# Patient Record
Sex: FEMALE | Race: WHITE
[De-identification: names, ages, dates, MRNs, and addresses within clinical notes are randomized per-mention and may not be internally consistent; named-entity substitution may affect disease eponyms.]

---

## 2019-06-19 ENCOUNTER — HOSPITAL ENCOUNTER (EMERGENCY)
Dept: HOSPITAL 10 - FTE | Age: 24
Discharge: HOME | End: 2019-06-19
Payer: COMMERCIAL

## 2019-06-19 ENCOUNTER — HOSPITAL ENCOUNTER (EMERGENCY)
Dept: HOSPITAL 91 - FTE | Age: 24
Discharge: HOME | End: 2019-06-19
Payer: COMMERCIAL

## 2019-06-19 VITALS
WEIGHT: 152.12 LBS | HEIGHT: 64 IN | HEIGHT: 64 IN | WEIGHT: 152.12 LBS | BODY MASS INDEX: 25.97 KG/M2 | BODY MASS INDEX: 25.97 KG/M2

## 2019-06-19 VITALS — HEART RATE: 74 BPM | SYSTOLIC BLOOD PRESSURE: 123 MMHG | RESPIRATION RATE: 24 BRPM | DIASTOLIC BLOOD PRESSURE: 64 MMHG

## 2019-06-19 DIAGNOSIS — R10.2: ICD-10-CM

## 2019-06-19 DIAGNOSIS — R35.0: Primary | ICD-10-CM

## 2019-06-19 LAB
ADD UMIC: YES
UR ASCORBIC ACID: NEGATIVE MG/DL
UR BACTERIA: (no result) /HPF
UR BILIRUBIN (DIP): NEGATIVE MG/DL
UR BLOOD (DIP): (no result) MG/DL
UR CLARITY: (no result)
UR COLOR: (no result)
UR GLUCOSE (DIP): NEGATIVE MG/DL
UR KETONES (DIP): NEGATIVE MG/DL
UR LEUKOCYTE ESTERASE (DIP): (no result) LEU/UL
UR NITRITE (DIP): NEGATIVE MG/DL
UR PH (DIP): 7 (ref 5–9)
UR RBC: 3 /HPF (ref 0–5)
UR SPECIFIC GRAVITY (DIP): 1 (ref 1–1.03)
UR SQUAMOUS EPITHELIAL CELL: (no result) /HPF
UR TOTAL PROTEIN (DIP): NEGATIVE MG/DL
UR UROBILINOGEN (DIP): NEGATIVE MG/DL
UR WBC: 13 /HPF (ref 0–5)
URINE BLOOD (DIP) POC: (no result)
URINE LEUKOCYTE EST (DIP) POC: (no result)
URINE PH (DIP) POC: 6.5 (ref 5–8.5)

## 2019-06-19 PROCEDURE — 81003 URINALYSIS AUTO W/O SCOPE: CPT

## 2019-06-19 PROCEDURE — 81001 URINALYSIS AUTO W/SCOPE: CPT

## 2019-06-19 PROCEDURE — 81025 URINE PREGNANCY TEST: CPT

## 2019-06-19 PROCEDURE — 99283 EMERGENCY DEPT VISIT LOW MDM: CPT

## 2019-06-19 NOTE — ERD
ER Documentation


Chief Complaint


Chief Complaint





LOWER ABDOMINAL PAIN RADIATING TO THE BACK X 2 WEEKS





HPI


23-year-old female with no reported past medical surgical history, history of 


UTIs, presents with complaint of dull lower pelvic discomfort, flank pain, and 


urinary frequency.  She otherwise denies fevers, chills, nausea, vomiting, 


diarrhea, abdominal pain, vaginal bleeding or discharge.  Last menstrual period 


was about 2 weeks ago reported as normal.  Has noticed change in smell of her 


urine.  She believes she has a UTI.  She otherwise is without complaint at time 


of evaluation.





ROS


All systems reviewed and are negative except as per history of present illness.





Medications


Home Meds


Active Scripts


Cephalexin* (Keflex*) 500 Mg Capsule, 500 MG PO BID for 7 Days, CAP


   Prov:AVINASH ESCOBEDO PA-C         6/19/19


Reported Medications


[none]   No Conflict Check


   10/16/13





Allergies


Allergies:  


Coded Allergies:  


     No Known Allergy (Unverified , 6/19/19)





PMhx/Soc


Medical and Surgical Hx:  pt denies Medical Hx, pt denies Surgical Hx


Hx Alcohol Use:  Yes (SOCIALLY)


Hx Substance Use:  No


Hx Tobacco Use:  No


Smoking Status:  Never smoker





FmHx


Family History:  No diabetes, No coronary disease, No other





Physical Exam


Vitals





Vital Signs


  Date      Temp  Pulse  Resp  B/P (MAP)   Pulse Ox  O2          O2 Flow    FiO2


Time                                                 Delivery    Rate


   6/19/19  97.7     74    24      123/64        99


     18:05                           (83)





Physical Exam


I have reviewed the triage vital signs.


Const: Well nourished, well developed, appears stated age


Eyes: PERRL, no conjunctival injection


HENT: NCAT, Neck supple without meningismus 


CV: RRR, Warm, well-perfused extremities


RESP: CTAB, Unlabored respiratory effort


GI: soft, non-tender, non-distended, no masses, mild left flank tenderness


MSK: No gross deformities appreciated


Skin: Warm, dry. No rashes


Neuro: grossly non focal


Psych: Appropriate mood and affect.


Results 24 hrs





Laboratory Tests


Test
                                6/19/19
19:50  6/19/19
19:53  6/19/19
19:54


Urine Color                        STRAW


Urine Clarity                      CLOUDY


Urine pH                                      7.0


Urine Specific Gravity                      1.005


Urine Ketones                      NEGATIVE mg/dL


Urine Nitrite                      NEGATIVE mg/dL


Urine Bilirubin                    NEGATIVE mg/dL


Urine Urobilinogen                 NEGATIVE mg/dL


Urine Leukocyte Esterase                2+ Tayla/ul


Urine Microscopic RBC                      3 /HPF


Urine Microscopic WBC                     13 /HPF


Urine Squamous Epithelial
Cells    MODERATE /HPF 
  
              



Urine Bacteria                     FEW /HPF


Urine Hemoglobin                         2+ mg/dL


Urine Glucose                      NEGATIVE mg/dL


Urine Total Protein                NEGATIVE mg/dl


Bedside Urine pH (LAB)                                       6.5


Bedside Urine Protein (LAB)                         Negative


Bedside Urine Glucose (UA)                          Negative


Bedside Urine Ketones (LAB)                         Negative


Bedside Urine Blood                                           2+


Bedside Urine Nitrite (LAB)                         Negative


Bedside Urine Leukocyte
Esterase   
                         1+ 
  



(L


POC Beta HCG, Qualitative                                          NEGATIVE








Procedures/MDM


Not Pregnant. Unlikely TOA, Ovarian Torsion, PID, gonorrhea/chlamydia. 


Low suspicion for Infected Urolithiasis, AAA, Cholecystitis, Pancreatitis, SBO, 


Appendicitis, or other acute abdomen.





ED course: UA with bacteria, 2+ leukocyte esterase, will treat with 7-day course


 of cephalexin





Rx: Keflex 500mg BID for 5 days


Disposition: Discharge home. SRP discussed. Advise follow up with primary care 


provider within 24-72 hours.





Departure


Diagnosis:  


   Primary Impression:  


   Urinary frequency


Condition:  Stable


Patient Instructions:  Urinary Tract Infections in Women


Referrals:  


COMMUNITY CLINICS


YOU HAVE RECEIVED A MEDICAL SCREENING EXAM AND THE RESULTS INDICATE THAT YOU DO 


NOT HAVE A CONDITION THAT REQUIRES URGENT TREATMENT IN THE EMERGENCY DEPARTMENT.





FURTHER EVALUATION AND TREATMENT OF YOUR CONDITION CAN WAIT UNTIL YOU ARE SEEN 


IN YOUR DOCTORS OFFICE WITHIN THE NEXT 1-2 DAYS. IT IS YOUR RESPONSIBILITY TO 


MAKE AN APPOINTMENT FOR FOLOW-UP CARE.





IF YOU HAVE A PRIMARY DOCTOR


--you should call your primary doctor and schedule an appointment





IF YOU DO NOT HAVE A PRIMARY DOCTOR YOU CAN CALL OUR PHYSICIAN REFERRAL HOTLINE 


AT


 [phone] 





IF YOU CAN NOT AFFORD TO SEE A PHYSICIAN YOU CAN CHOSE FROM THE FOLLOWING 


[facility]





[facility] [phone] [address] [facility] [address] [facility]. [facility] [phone] [address] [facility]. [facility] [[email or web address]] [phone] [address] [facility]. [facility] [phone] [address]. [facility] [phone] [address] [facility]. [facility]. [phone] [address]





Additional Instructions:  


Call your primary care doctor TOMORROW for an appointment during the next 2-3 


days.See the doctor sooner or return here if your condition worsens before your 


appointment time.











AVINASH ESCOBEDO PA-C             Jun 19, 2019 20:21

## 2019-06-26 ENCOUNTER — HOSPITAL ENCOUNTER (EMERGENCY)
Dept: HOSPITAL 91 - FTE | Age: 24
Discharge: HOME | End: 2019-06-26
Payer: COMMERCIAL

## 2019-06-26 ENCOUNTER — HOSPITAL ENCOUNTER (EMERGENCY)
Dept: HOSPITAL 10 - FTE | Age: 24
Discharge: HOME | End: 2019-06-26
Payer: COMMERCIAL

## 2019-06-26 VITALS
WEIGHT: 173.94 LBS | WEIGHT: 173.94 LBS | BODY MASS INDEX: 30.82 KG/M2 | BODY MASS INDEX: 30.82 KG/M2 | HEIGHT: 63 IN | HEIGHT: 63 IN

## 2019-06-26 VITALS — RESPIRATION RATE: 16 BRPM | SYSTOLIC BLOOD PRESSURE: 122 MMHG | DIASTOLIC BLOOD PRESSURE: 78 MMHG | HEART RATE: 63 BPM

## 2019-06-26 DIAGNOSIS — R07.89: Primary | ICD-10-CM

## 2019-06-26 LAB
ADD MAN DIFF?: NO
ANION GAP: 13 (ref 5–13)
BASOPHIL #: 0.1 10^3/UL (ref 0–0.1)
BASOPHILS %: 0.6 % (ref 0–2)
BLOOD UREA NITROGEN: 7 MG/DL (ref 7–20)
CALCIUM: 10.2 MG/DL (ref 8.4–10.2)
CARBON DIOXIDE: 27 MMOL/L (ref 21–31)
CHLORIDE: 105 MMOL/L (ref 97–110)
CREATININE: 0.57 MG/DL (ref 0.44–1)
EOSINOPHILS #: 0.6 10^3/UL (ref 0–0.5)
EOSINOPHILS %: 7.1 % (ref 0–7)
GLUCOSE: 88 MG/DL (ref 70–220)
HEMATOCRIT: 41.7 % (ref 37–47)
HEMOGLOBIN: 14.2 G/DL (ref 12–16)
IMMATURE GRANS #M: 0.03 10^3/UL (ref 0–0.03)
IMMATURE GRANS % (M): 0.4 % (ref 0–0.43)
LYMPHOCYTES #: 2.8 10^3/UL (ref 0.8–2.9)
LYMPHOCYTES %: 32.8 % (ref 15–51)
MEAN CORPUSCULAR HEMOGLOBIN: 29.5 PG (ref 29–33)
MEAN CORPUSCULAR HGB CONC: 34.1 G/DL (ref 32–37)
MEAN CORPUSCULAR VOLUME: 86.7 FL (ref 82–101)
MEAN PLATELET VOLUME: 10.3 FL (ref 7.4–10.4)
MONOCYTE #: 0.5 10^3/UL (ref 0.3–0.9)
MONOCYTES %: 5.9 % (ref 0–11)
NEUTROPHIL #: 4.5 10^3/UL (ref 1.6–7.5)
NEUTROPHILS %: 53.2 % (ref 39–77)
NUCLEATED RED BLOOD CELLS #: 0 10^3/UL (ref 0–0)
NUCLEATED RED BLOOD CELLS%: 0 /100WBC (ref 0–0)
PLATELET COUNT: 301 10^3/UL (ref 140–415)
POTASSIUM: 4.5 MMOL/L (ref 3.5–5.1)
RED BLOOD COUNT: 4.81 10^6/UL (ref 4.2–5.4)
RED CELL DISTRIBUTION WIDTH: 12.9 % (ref 11.5–14.5)
SODIUM: 145 MMOL/L (ref 135–144)
TROPONIN-I: < 0.012 NG/ML (ref 0–0.12)
WHITE BLOOD COUNT: 8.4 10^3/UL (ref 4.8–10.8)

## 2019-06-26 PROCEDURE — 96372 THER/PROPH/DIAG INJ SC/IM: CPT

## 2019-06-26 PROCEDURE — 93005 ELECTROCARDIOGRAM TRACING: CPT

## 2019-06-26 PROCEDURE — 85025 COMPLETE CBC W/AUTO DIFF WBC: CPT

## 2019-06-26 PROCEDURE — 84484 ASSAY OF TROPONIN QUANT: CPT

## 2019-06-26 PROCEDURE — 99285 EMERGENCY DEPT VISIT HI MDM: CPT

## 2019-06-26 PROCEDURE — 71045 X-RAY EXAM CHEST 1 VIEW: CPT

## 2019-06-26 PROCEDURE — 81025 URINE PREGNANCY TEST: CPT

## 2019-06-26 PROCEDURE — 80048 BASIC METABOLIC PNL TOTAL CA: CPT

## 2019-06-26 PROCEDURE — 36415 COLL VENOUS BLD VENIPUNCTURE: CPT

## 2019-06-26 RX ADMIN — KETOROLAC TROMETHAMINE 1 MG: 30 INJECTION, SOLUTION INTRAMUSCULAR at 16:13

## 2019-06-26 NOTE — ERD
ER Documentation


Chief Complaint


Chief Complaint





CP X 3 DAYS





HPI


This is a 23-year-old female with a nonsignificant past medical history presents


ED with complaints of anterior chest pain x3 days.  Patient states that the 


chest pain is constant and she describes it as a burning sharp sensation and 


rates it at a 7 out of 10.  Patient states that the pain is aggravated by 


drinking coffee and with palpation.  Denies any alleviating factors.  has not 


tried taking any medications for pain. Patient was seen here recently and 


diagnosed with UTI and is currently taking Keflex antibiotic.  Almost finished 


with full course of antibiotics. no recent respiratory infection. Patient denies


any fevers, chills, nausea, vomiting, diarrhea, constipation, abdominal pain, 


cough, sputum production, congestion, runny nose, sore throat, trouble 


breathing, pleuritic chest pain, leg swelling, leg pain.  Patient denies cardiac


risk factors including: hypertension, diabetes mellitus, hypercholesterolemia, 


physical inactivity, smoking, hx of CAD, and prior stress/cath.  Patient denies 


PE risk factors including recent surgery/immobilization, smoking, prior hx of 


DVT, coagulopathy, hx of cancer, exogenous estrogen use, one sided lower 


extremity swelling, and lower extremity pain





ROS


All systems reviewed and are negative except as per history of present illness.





Medications


Home Meds


Active Scripts


Cephalexin* (Keflex*) 500 Mg Capsule, 500 MG PO BID for 7 Days, CAP


   Prov:AVINASH ESCOBEDO PA-C         19


Reported Medications


[none]   No Conflict Check


   10/16/13





Allergies


Allergies:  


Coded Allergies:  


     No Known Allergy (Unverified , 19)





PMhx/Soc


Hx Alcohol Use:  Yes (SOCIALLY)


Hx Substance Use:  No


Hx Tobacco Use:  No





FmHx


Family History:  No diabetes





Physical Exam


Vitals





Vital Signs


  Date      Temp  Pulse  Resp  B/P (MAP)   Pulse Ox  O2          O2 Flow    FiO2


Time                                                 Delivery    Rate


   19           76    18      122/70       100  Room Air


     16:09                           (87)


   19  98.1     80    18      128/79        99


     13:50                           (95)





Physical Exam


Physical Exam


Vitals signs: Reviewed by me.


General: Well developed, well nourished, in no acute distress. Patient is awake 


and alert.


Head: Normocephalic, atraumatic.


Eyes: Normal conjunctiva, Pupils PERRLA, EOM intact grossly


ENT: Pharynx is clear, Moist mucous membranes, external ears, nose and mouth 


normal


Neck: Supple, no masses, lymphadenopathy or JVD


Chest: No increased AP diameter, no flail chest, mild tenderness palpation along


 the sternal area


Respiratory: Clear to auscultation bilaterally with no wheezing, rhonchi, rales,


 no distress


Cardiovascular: RRR, no murmurs, rubs, or gallops


Abdominal: Soft, non-tender, non-distended, no peritoneal signs


: Deferred


MSK: No edema, no unilateral swelling, 5/5 strength


Back: No midline tenderness. No flank tenderness


Neurologic: Alert and oriented, moving all extremities, normal speech, no focal 


weakness, no cerebellar signs. Normal mentation


Skin: warm and dry, No rash


Psych: Normal mood


Result Diagram:  


19 1602                                                                    


            19 1602





Results 24 hrs





Laboratory Tests


       Test
                                 19
16:02  19
16:07


       White Blood Count                      8.4 10^3/ul


       Red Blood Count                       4.81 10^6/ul


       Hemoglobin                               14.2 g/dl


       Hematocrit                                  41.7 %


       Mean Corpuscular Volume                    86.7 fl


       Mean Corpuscular Hemoglobin                29.5 pg


       Mean Corpuscular Hemoglobin
Concent     34.1 g/dl 
  



       Red Cell Distribution Width                 12.9 %


       Platelet Count                         301 10^3/UL


       Mean Platelet Volume                       10.3 fl


       Immature Granulocytes %                    0.400 %


       Neutrophils %                               53.2 %


       Lymphocytes %                               32.8 %


       Monocytes %                                  5.9 %


       Eosinophils %                                7.1 %


       Basophils %                                  0.6 %


       Nucleated Red Blood Cells %            0.0 /100WBC


       Immature Granulocytes #              0.030 10^3/ul


       Neutrophils #                          4.5 10^3/ul


       Lymphocytes #                          2.8 10^3/ul


       Monocytes #                            0.5 10^3/ul


       Eosinophils #                          0.6 10^3/ul


       Basophils #                            0.1 10^3/ul


       Nucleated Red Blood Cells #            0.0 10^3/ul


       Sodium Level                            145 mmol/L


       Potassium Level                         4.5 mmol/L


       Chloride Level                          105 mmol/L


       Carbon Dioxide Level                     27 mmol/L


       Anion Gap                                       13


       Blood Urea Nitrogen                        7 mg/dl


       Creatinine                              0.57 mg/dl


       Est Glomerular Filtrat Rate
mL/min   > 60 mL/min 
   



       Glucose Level                             88 mg/dl


       Calcium Level                           10.2 mg/dl


       Troponin I                           < 0.012 ng/ml


       POC Beta HCG, Qualitative                            NEGATIVE





Current Medications


 Medications
   Dose
          Sig/Teetee
       Start Time
   Status  Last


 (Trade)       Ordered        Route
 PRN     Stop Time              Admin
Dose


                              Reason                                Admin


 Ketorolac
     60 mg          ONCE  STAT
    19       DC           19


Tromethamine
                 IM
            15:24
                       16:13



 (Toradol)                                   19 15:26








Procedures/MDM


EKG, MONITORS, & DIAGNOSTIC IMAGIng


EKG read by nidia: 


Rate/Rhythm: Regular rate and rhythm at a rate of 64


Intervals:    Normal


Impression:     No evidence of ischemia or arrhythmia


                          [place] [address]


                        Radiology Main Line: [phone]





                            DIAGNOSTIC IMAGING REPORT





Patient: ZULMA DENT   : 1995   Age: 23  Sex: F                    


    


       MR #:    [id number]   Acct #:   [id number]    DOS: 19 1524


Ordering MD: EVITA OROURKE PA-C   Location:  [facility]   Room/Bed:               


                             


                                        


PROCEDURE:   XR Chest. 


 


CLINICAL INDICATION:   Chest Pain. 


 


TECHNIQUE: Portable AP view of the chest was obtained. 


 


COMPARISON:   CR CHEST 10/16/2013 


 


FINDINGS:


The cardiomediastinal silhouette is within normal limits. The lungs are clear. 


No signs of pleural fluid or pneumothorax are seen. The osseous structures and 


soft tissues are unremarkable. 


 


IMPRESSION:


No evidence for active cardiopulmonary disease. 


 


RPTAT:AAJJ


_____________________________________________ 


R-Ryan Franke, Physician           Date    Time 


Electronically viewed and signed by R-Ryan Franke, Physician on 2019 


16:59 


 


D:  2019 16:59  T:  2019 16:59


RF/





CC: EVITA OROURKE PA-C





[id number]





LAB INTERPRETATION:


CBC shows no evidence of hemorrhage or infection


Chemistry shows no evidence of significant electrolyte abnormalities or renal 


insufficiency


Cardiac biomarkers show no evidence of acute myocardial injury or coronary 


ischemia


urine preg neg





ER COURSE:


The patient was given Toradol


The medication was well tolerated and the patient reports improvement in 


symptoms.  


The patient was stable throughout ED course.


I kept the patient and/or family informed of laboratory and diagnostic imaging 


results throughout the emergency room course.





The patient was promptly evaluated and a treatment plan was devised based on H&P


 and other data. This plan was discussed with the patient who agreed and had no 


further questions or concerns prior to discharge.





MEDICAL DECISION MAKING:


This is a 23-year-old female presents ED with complaints of chest pain x3 days. 


 The patient presents with chest pain and I considered GERD, anxiety, 


costochondritis, endocarditis pulmonary embolism, acute coronary syndrome, 


STEMI, and STEMI, pericarditis, aortic dissection, pneumothorax, tension 


pneumothorax, pneumonia, pleural effusion, among other diagnoses. Per the perc 


criteria pulmonary embolism can be ruled out. Patient is low risk for PE by 


well's criteria with the score of 0. Patient also does not have any risk factors


 for pulmonary embolism. Chest x-ray is unremarkable. EKG is also unremarkable. 


Evaluation for acute coronary syndrome was performed. The HEART score 


(www.mdcalc.com <http://www.mdcalc.com>) was utilized for risk stratification 


and found to be 0. Based on this evaluation the patients risk of major adverse 


cardiac events is <1%.  Given that pain is worsened with palpation this is 


likely a costochondritis.  Patient was given Toradol in the emergency department


 reports improvement in pain.  History and physical examination other data not 


consistent with emergent processes including lung not limited to endocarditis, 


acute coronary syndrome, pulmonary embolism, pneumonia, pleural effusion, 


pneumothorax, tension pneumothorax, aortic dissection, esophageal rupture, among


 others. Patient vitals are stable and patient can be managed with close 


outpatient follow. patient advised to follow up with primary care in the next 48


 hours. return to ed with any worsening symptoms


 





DISPOSITION PLAN:


We discussed follow up with the patient's primary care doctor within 24 to 48 


hours.  Patient counseled regarding my diagnostic impression and care plan. 


Prior to discharge all questions answered. Pt agrees with treatment plan and 


understands strict return precautions. Precautionary instructions provided 


including instructions to return to the ER if not improving or for any worsening


 or changing symptoms or concerns.





SPECIALIST FOLLOW UP RECOMMENDED: None


Patient has been advised to follow up with primary care in 1-2 days.





Disclaimer: Inadvertent spelling and grammatical errors are likely due to 


EHR/dictation software use and do not reflect on the overall quality of patient 


care. Also, please note that the electronic time recorded on this note does not 


necessarily reflect the actual time of the patient encounter.





Blood Pressure Assessment: Patient's blood pressure was elevated (>120/80) but 


appears stable without evidence of hypertension emergency or urgency.  The 


patient was counseled about the risks of hypertension and urged to pursue 


outpatient monitoring and therapy within a week with their primary care 


physician.





Departure


Diagnosis:  


   Primary Impression:  


   Chest pain


   Chest pain type:  unspecified  Qualified Codes:  R07.9 - Chest pain, un


   specified


Condition:  Stable


Patient Instructions:  Chest Pain, Noncardiac , Chest Pain, Uncertain Cause, 


Chest Wall Pain, Costochondritis


Referrals:  


COMM[facility] CLINICS





Additional Instructions:  


Patient advised to return to the ED immediately for new or worsening symptoms. 


Patient advised to follow up with primary care provider in the next 24-48 hours.


 Patient verbalized understanding and agrees with treatment plan and course of 


action.





If patient has no primary care they may follow up with one of the community 


clinics listed on the following page or one of the options listed below








[facility]


[address] [place] [id number]





or





[facility]


[address] [place] [address]





or





[facility]


[address] [place] [address]











EVITA OROURKE PA-C          2019 15:45